# Patient Record
Sex: FEMALE | URBAN - METROPOLITAN AREA
[De-identification: names, ages, dates, MRNs, and addresses within clinical notes are randomized per-mention and may not be internally consistent; named-entity substitution may affect disease eponyms.]

---

## 2022-05-31 ENCOUNTER — APPOINTMENT (RX ONLY)
Dept: URBAN - METROPOLITAN AREA CLINIC 41 | Facility: CLINIC | Age: 72
Setting detail: DERMATOLOGY
End: 2022-05-31

## 2022-05-31 DIAGNOSIS — Z41.9 ENCOUNTER FOR PROCEDURE FOR PURPOSES OTHER THAN REMEDYING HEALTH STATE, UNSPECIFIED: ICD-10-CM | Status: STABLE

## 2022-05-31 PROBLEM — L68.9 HYPERTRICHOSIS, UNSPECIFIED: Status: ACTIVE | Noted: 2022-05-31

## 2022-05-31 PROCEDURE — ? ADDITIONAL NOTES

## 2022-05-31 PROCEDURE — 99203 OFFICE O/P NEW LOW 30 MIN: CPT

## 2022-05-31 PROCEDURE — ? PRESCRIPTION MEDICATION MANAGEMENT

## 2022-05-31 PROCEDURE — ? COUNSELING

## 2022-05-31 ASSESSMENT — LOCATION SIMPLE DESCRIPTION DERM
LOCATION SIMPLE: RIGHT FOREHEAD
LOCATION SIMPLE: LEFT FOREHEAD

## 2022-05-31 ASSESSMENT — LOCATION DETAILED DESCRIPTION DERM
LOCATION DETAILED: RIGHT LATERAL FOREHEAD
LOCATION DETAILED: LEFT LATERAL FOREHEAD

## 2022-05-31 ASSESSMENT — LOCATION ZONE DERM: LOCATION ZONE: FACE

## 2022-05-31 NOTE — PROCEDURE: ADDITIONAL NOTES
Detail Level: Simple
Render Risk Assessment In Note?: no
Additional Notes: Pt recently started using keratin a month ago. EG notes that the growth on the face is likely not due to keratin as pt has only been using keratin for a month but possible to get hair growth in other areas. EG notes that the growth may be due to hormonal changes and recommends pt to get a mammogram. Pt notes she is currently on arimidex for breast cancer that she had many years ago. EG recommends pt sees gynecologist about facial hair growth. 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 EG notes that pt can get laser hair removal to treat hair growth or pt can use topicals to thin out hair.

## 2022-05-31 NOTE — PROCEDURE: PRESCRIPTION MEDICATION MANAGEMENT
Render In Strict Bullet Format?: No
Plan: Pt recently started using keratin a month ago. EG notes that the growth on the face is likely not due to keratin as pt has only been using keratin for a month but possible to get hair growth in other areas. EG notes that the growth may be due to hormonal changes and recommends pt to get a mammogram. Pt notes she is currently on arimidex for breast cancer that she had many years ago. EG recommends pt sees gynecologist about facial hair growth. 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 EG notes that pt can get laser hair removal to treat hair growth or pt can use topicals to thin out hair. If patient uses topical prescription Vaniqa it can lighten and thin hair, but may make laser hair removal less effective, as laser hair removal does better with darker coarse hairs. Pt will consider laser hair removal and follow up with gyne as recommended.
Detail Level: Zone

## 2022-05-31 NOTE — HPI: HAIR LOSS
Additional History: New old pt. \\nPt has been experiencing hair loss for many years and has starting using keratin. \\nPt is also noticing hair growth on her face a month ago. \\n